# Patient Record
(demographics unavailable — no encounter records)

---

## 2025-02-21 NOTE — PHYSICAL EXAM
[Normal] : mucosa is normal [Midline] : trachea located in midline position [de-identified] : 1+, no exudate, no masses or lesions

## 2025-02-21 NOTE — HISTORY OF PRESENT ILLNESS
[de-identified] : 28F presenting for f/u s/p strep infection 1 month ago with throat and tongue swelling. Pt was seen at [facility] and prescribed amoxicillin and medrol, with relief of symptoms. Referred to f/u with ENT for check up. SHe does not have a hx of tonsillitis. She is back to her baseline.